# Patient Record
Sex: MALE | Race: BLACK OR AFRICAN AMERICAN | HISPANIC OR LATINO | URBAN - METROPOLITAN AREA
[De-identification: names, ages, dates, MRNs, and addresses within clinical notes are randomized per-mention and may not be internally consistent; named-entity substitution may affect disease eponyms.]

---

## 2017-05-04 ENCOUNTER — EMERGENCY (EMERGENCY)
Facility: HOSPITAL | Age: 57
LOS: 1 days | Discharge: HOME | End: 2017-05-04
Attending: EMERGENCY MEDICINE | Admitting: EMERGENCY MEDICINE
Payer: COMMERCIAL

## 2017-05-04 VITALS
HEART RATE: 80 BPM | HEIGHT: 68 IN | TEMPERATURE: 98 F | WEIGHT: 176.37 LBS | RESPIRATION RATE: 16 BRPM | OXYGEN SATURATION: 98 % | SYSTOLIC BLOOD PRESSURE: 157 MMHG | DIASTOLIC BLOOD PRESSURE: 79 MMHG

## 2017-05-04 VITALS
SYSTOLIC BLOOD PRESSURE: 139 MMHG | TEMPERATURE: 99 F | HEART RATE: 77 BPM | OXYGEN SATURATION: 96 % | DIASTOLIC BLOOD PRESSURE: 78 MMHG | RESPIRATION RATE: 18 BRPM

## 2017-05-04 DIAGNOSIS — Z79.84 LONG TERM (CURRENT) USE OF ORAL HYPOGLYCEMIC DRUGS: ICD-10-CM

## 2017-05-04 DIAGNOSIS — I10 ESSENTIAL (PRIMARY) HYPERTENSION: ICD-10-CM

## 2017-05-04 DIAGNOSIS — R21 RASH AND OTHER NONSPECIFIC SKIN ERUPTION: ICD-10-CM

## 2017-05-04 DIAGNOSIS — B02.9 ZOSTER WITHOUT COMPLICATIONS: ICD-10-CM

## 2017-05-04 DIAGNOSIS — E11.9 TYPE 2 DIABETES MELLITUS WITHOUT COMPLICATIONS: ICD-10-CM

## 2017-05-04 DIAGNOSIS — Z79.899 OTHER LONG TERM (CURRENT) DRUG THERAPY: ICD-10-CM

## 2017-05-04 PROCEDURE — 99284 EMERGENCY DEPT VISIT MOD MDM: CPT

## 2017-05-04 PROCEDURE — 99284 EMERGENCY DEPT VISIT MOD MDM: CPT | Mod: 25

## 2017-05-04 PROCEDURE — 96374 THER/PROPH/DIAG INJ IV PUSH: CPT

## 2017-05-04 RX ORDER — VALACYCLOVIR 500 MG/1
1 TABLET, FILM COATED ORAL
Qty: 21 | Refills: 0 | OUTPATIENT
Start: 2017-05-04 | End: 2017-05-11

## 2017-05-04 RX ORDER — OXYCODONE HYDROCHLORIDE 5 MG/1
1 TABLET ORAL
Qty: 18 | Refills: 0 | OUTPATIENT
Start: 2017-05-04 | End: 2017-05-07

## 2017-05-04 RX ORDER — OXYCODONE HYDROCHLORIDE 5 MG/1
5 TABLET ORAL ONCE
Qty: 0 | Refills: 0 | Status: DISCONTINUED | OUTPATIENT
Start: 2017-05-04 | End: 2017-05-04

## 2017-05-04 RX ORDER — KETOROLAC TROMETHAMINE 30 MG/ML
15 SYRINGE (ML) INJECTION ONCE
Qty: 0 | Refills: 0 | Status: DISCONTINUED | OUTPATIENT
Start: 2017-05-04 | End: 2017-05-04

## 2017-05-04 RX ORDER — VALACYCLOVIR 500 MG/1
1000 TABLET, FILM COATED ORAL ONCE
Qty: 0 | Refills: 0 | Status: COMPLETED | OUTPATIENT
Start: 2017-05-04 | End: 2017-05-04

## 2017-05-04 RX ADMIN — VALACYCLOVIR 1000 MILLIGRAM(S): 500 TABLET, FILM COATED ORAL at 12:18

## 2017-05-04 RX ADMIN — Medication 15 MILLIGRAM(S): at 12:18

## 2017-05-04 RX ADMIN — OXYCODONE HYDROCHLORIDE 5 MILLIGRAM(S): 5 TABLET ORAL at 12:18

## 2017-05-04 NOTE — ED PROVIDER NOTE - PLAN OF CARE
1) Please take medications as directed  2) Oxycodone every 4 hours as needed, tylenol every 4 or 6 hours as needed, motrin every 8 hours as needed  3) You must check your sugars as the steroids will raise your sugar level- please see your endocrinologist or primary care doctor  4) Return for fever, inability to walk or rash that crosses the midline of back or any concerns 1) Please take medications as directed  2) Oxycodone every 4 hours as needed, tylenol every 4 or 6 hours as needed, motrin every 8 hours as neededor   3) Return for fever, inability to walk or rash that crosses the midline of back or any concerns

## 2017-05-04 NOTE — ED PROVIDER NOTE - OBJECTIVE STATEMENT
55 yo male with PMH of HTN, HL, DM, CAD with 2 stents on asa, DVTs 3 years ago (no longer on coumadin), who presents with painful rash on right hip with no f/s/c/n/v/diarrhea and he is able to ambulate. Rash began Monday. On exam, patient has zoster L1, L2 right dermatomal distribution with no genital involvement, and not crossing midline.

## 2017-05-04 NOTE — ED PROVIDER NOTE - CARE PLAN
Principal Discharge DX:	Shingles  Instructions for follow-up, activity and diet:	1) Please take medications as directed  2) Oxycodone every 4 hours as needed, tylenol every 4 or 6 hours as needed, motrin every 8 hours as needed  3) You must check your sugars as the steroids will raise your sugar level- please see your endocrinologist or primary care doctor  4) Return for fever, inability to walk or rash that crosses the midline of back or any concerns Principal Discharge DX:	Shingles  Instructions for follow-up, activity and diet:	1) Please take medications as directed  2) Oxycodone every 4 hours as needed, tylenol every 4 or 6 hours as needed, motrin every 8 hours as neededor   3) Return for fever, inability to walk or rash that crosses the midline of back or any concerns

## 2017-08-06 RX ORDER — METOPROLOL TARTRATE 50 MG
1 TABLET ORAL
Qty: 0 | Refills: 0 | COMMUNITY

## 2017-08-06 RX ORDER — CANAGLIFLOZIN 100 MG/1
1 TABLET, FILM COATED ORAL
Qty: 0 | Refills: 0 | COMMUNITY

## 2017-08-06 RX ORDER — SITAGLIPTIN 50 MG/1
1 TABLET, FILM COATED ORAL
Qty: 0 | Refills: 0 | COMMUNITY

## 2017-08-06 RX ORDER — GLIMEPIRIDE 1 MG
1 TABLET ORAL
Qty: 0 | Refills: 0 | COMMUNITY

## 2017-08-06 RX ORDER — LISINOPRIL 2.5 MG/1
1 TABLET ORAL
Qty: 0 | Refills: 0 | COMMUNITY

## 2021-04-20 NOTE — ED ADULT NURSE NOTE - OBJECTIVE STATEMENT
Patient has been up on the unit. He returned to bed after eating breakfast. He declined to attend the 1 group. He denied any feelings of Anxiety, SI or HI. He rated his depression 4/10. He has verbalizes having racing thoughts & bad dreams about him killing himself. He has verbally contracted for safety. He has been up in the dayroom viewing the t,v @ intervals. He remains on close observation. 56 year old male alert and oriented x 4 came to the ED accompanied by wife c/o rash on the right hip since Monday.  Patient says his rash began Monday and has not spread since.  He works in [employer] in [employer] in the fields as a [employer].  Patient arrived from [place] on 4/22/17.  Patient denies; shortness of breath, cough, chest pain, nausea, vomiting, fevers, chills.  Patient has flat circular rash localized to the right hip, groin and upper thigh and says pain is 7/10 and hurts when he walks, but not at rest.  Patient denies taking any medications for pain.  Patient in no acute distress and breathing is unlabored and chest rise symmetrical.  Patient able to move all 4 extremities.  IV established in the right forearm and safety maintained.        Patient type 2 diabetic and uses [employer]ok[employer]

## 2024-05-31 NOTE — ED ADULT NURSE NOTE - FALL HARM RISK TYPE OF ASSESSMENT
Admission [Joint Pain] : joint pain [Back Pain] : back pain [Anxiety] : anxiety [Negative] : Heme/Lymph